# Patient Record
Sex: MALE | Race: ASIAN | NOT HISPANIC OR LATINO | URBAN - METROPOLITAN AREA
[De-identification: names, ages, dates, MRNs, and addresses within clinical notes are randomized per-mention and may not be internally consistent; named-entity substitution may affect disease eponyms.]

---

## 2024-06-04 ENCOUNTER — OFFICE (OUTPATIENT)
Dept: URBAN - METROPOLITAN AREA CLINIC 19 | Facility: CLINIC | Age: 29
End: 2024-06-04
Payer: OTHER GOVERNMENT

## 2024-06-04 VITALS
SYSTOLIC BLOOD PRESSURE: 144 MMHG | DIASTOLIC BLOOD PRESSURE: 68 MMHG | WEIGHT: 198 LBS | OXYGEN SATURATION: 100 % | HEIGHT: 73 IN | HEART RATE: 66 BPM

## 2024-06-04 DIAGNOSIS — R19.7 DIARRHEA, UNSPECIFIED: ICD-10-CM

## 2024-06-04 PROCEDURE — 99204 OFFICE O/P NEW MOD 45 MIN: CPT | Performed by: NURSE PRACTITIONER

## 2024-06-04 RX ORDER — SODIUM PICOSULFATE, MAGNESIUM OXIDE, AND ANHYDROUS CITRIC ACID 12; 3.5; 1 G/175ML; G/175ML; MG/175ML
LIQUID ORAL
Qty: 1 | Refills: 0 | Status: ACTIVE
Start: 2024-06-04

## 2024-06-04 NOTE — SERVICEHPINOTES
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  For at least the last 6 months, he has been having a softer loose stool 1st thing in the morning.  Some days he will have to have another bowel movement that is also loose.  There is urgency with the bowel movements but he denies any abdominal pain, diarrhea, rectal bleeding, or weight loss.  He denies any precipitating factors prior to the diarrhea starting such as dietary changes, antibiotic use, or travel.  He has not tried any medication for the diarrhea.  He denies any family history of colon cancer, celiac disease, ulcerative colitis, or Crohn's.